# Patient Record
Sex: FEMALE | Race: WHITE
[De-identification: names, ages, dates, MRNs, and addresses within clinical notes are randomized per-mention and may not be internally consistent; named-entity substitution may affect disease eponyms.]

---

## 2018-06-06 ENCOUNTER — HOSPITAL ENCOUNTER (OUTPATIENT)
Dept: HOSPITAL 47 - RADUSMAIN | Age: 23
Discharge: HOME | End: 2018-06-06
Payer: MEDICAID

## 2018-06-06 ENCOUNTER — HOSPITAL ENCOUNTER (OUTPATIENT)
Dept: HOSPITAL 47 - LABWHC1 | Age: 23
Discharge: HOME | End: 2018-06-06
Payer: MEDICAID

## 2018-06-06 DIAGNOSIS — E04.2: Primary | ICD-10-CM

## 2018-06-06 LAB — T4 FREE SERPL-MCNC: 1.66 NG/DL (ref 0.78–2.19)

## 2018-06-06 PROCEDURE — 84443 ASSAY THYROID STIM HORMONE: CPT

## 2018-06-06 PROCEDURE — 84439 ASSAY OF FREE THYROXINE: CPT

## 2018-06-06 PROCEDURE — 36415 COLL VENOUS BLD VENIPUNCTURE: CPT

## 2018-06-06 PROCEDURE — 76536 US EXAM OF HEAD AND NECK: CPT

## 2018-06-07 NOTE — US
EXAMINATION TYPE: US thyroid st tissue head/neck

 

DATE OF EXAM: 6/6/2018

 

COMPARISON: NONE

 

CLINICAL HISTORY: E04.2 NONTOXIC MULTINODULAR GOITER. Patient states having bilateral nodules with le
ft bx= benign.  On thyroid medications.

 

GLAND SIZE:

 

Right Lobe: 4.2 x 1.7 x 1.0 cm

** Overall Parenchyma:  homogenous

Left Lobe: 3.5 x 1.1 x 1.2 cm

** Overall Parenchyma:  homogeneous

Isthmus Thickness:  0.3 cm

 

NODULES

 

RIGHT:   # of nodules measured on right: 1

1.  0.4 X 0.4  x 0.3 cm hypoechoic solid nodule at the lower pole with well-defined margins.  This no
dule is taller than wide and shows no intranodular vascularity.

** Prior size: No prior 

 

LEFT:    # of nodules measured on left:  1

1.  0.6 X 0.5  x 0.4 cm hypoechoic solid nodule at the lower pole with well-defined margins.  This no
dule is taller than wide and shows no intranodular vascularity.

** Prior size: No prior 

 

 

ISTHMUS:    # of nodules measured in the isthmus:  1

1.  1.0 X 0.8  x 0.5 cm hypoechoic solid nodule at the lower pole with well-defined margins.  This no
dule is wider than tall and shows intranodular vascularity.

** Prior size:  No prior 

 

Bilateral neck scanned, no evidence of lymphadenopathy.

 

 

 

 

 

IMPRESSION:

Findings suggest multinodular goiter.